# Patient Record
Sex: MALE | Race: WHITE | NOT HISPANIC OR LATINO
[De-identification: names, ages, dates, MRNs, and addresses within clinical notes are randomized per-mention and may not be internally consistent; named-entity substitution may affect disease eponyms.]

---

## 2021-12-21 PROBLEM — Z00.00 ENCOUNTER FOR PREVENTIVE HEALTH EXAMINATION: Status: ACTIVE | Noted: 2021-12-21

## 2022-01-06 ENCOUNTER — NON-APPOINTMENT (OUTPATIENT)
Age: 72
End: 2022-01-06

## 2022-03-02 ENCOUNTER — APPOINTMENT (OUTPATIENT)
Dept: NEUROLOGY | Facility: CLINIC | Age: 72
End: 2022-03-02
Payer: MEDICARE

## 2022-03-02 VITALS
SYSTOLIC BLOOD PRESSURE: 162 MMHG | HEIGHT: 72 IN | WEIGHT: 206 LBS | DIASTOLIC BLOOD PRESSURE: 92 MMHG | BODY MASS INDEX: 27.9 KG/M2 | HEART RATE: 65 BPM

## 2022-03-02 DIAGNOSIS — H53.2 DIPLOPIA: ICD-10-CM

## 2022-03-02 DIAGNOSIS — G25.81 RESTLESS LEGS SYNDROME: ICD-10-CM

## 2022-03-02 PROCEDURE — 99205 OFFICE O/P NEW HI 60 MIN: CPT

## 2022-03-02 NOTE — DISCUSSION/SUMMARY
[FreeTextEntry1] : PD with postural and gait changes c/b RLS. Suspect cognitive issues related to polypharmacy \par CI\par LBP\par \par Patient was counseled on the following recommendations:\par \par - - shift neupro 8mg to qhs and taper off requip over 4 weeks\par - reduce hs sinemet ER to  and leave rest of the regimen the same\par - plan to reduce GBP and donepezil in the future\par - refer to dr. Santillan for LBP management\par - refer to neuropthalmology for eval of diplopia\par - keep log of when right leg tightness occur ?wearing off symptom\par \par f/u 3months

## 2022-03-02 NOTE — PHYSICAL EXAM
[FreeTextEntry1] : Last dose 1hr\par \par as mild generalized LID. EOMI with severe CI. Tremors absent. Esperanza 1+. michel is normal. LE strength5/5 \par Walks with mild-mod camptocormia. Shuffles with no FOG. Pull test is negative

## 2022-04-07 DIAGNOSIS — G20 PARKINSON'S DISEASE: ICD-10-CM

## 2022-04-07 DIAGNOSIS — R41.89 OTHER SYMPTOMS AND SIGNS INVOLVING COGNITIVE FUNCTIONS AND AWARENESS: ICD-10-CM

## 2022-04-11 ENCOUNTER — APPOINTMENT (OUTPATIENT)
Dept: PAIN MANAGEMENT | Facility: CLINIC | Age: 72
End: 2022-04-11

## 2022-07-25 NOTE — HISTORY OF PRESENT ILLNESS
[FreeTextEntry1] : Patient is seeing me for management of PD\par \par Was diagnosed in 2015 with initial symptoms of right hand rest tremor and stooped posture with slowing of his gait. There was no falls. he was started on levodopa with robust improvement in his motor symptoms. About 3 years ago he was started on dopamine agonists for RLS. The first was the neupro patch and ropinrole was eventually added for worsening RLS. He describes his RLS as a gnawing sensation in both legs with need to walk. At this time, his RLS has been stable. When it occurs, he can be pacing all night. \par \par Patient has visual hallucinations for the past 3 years-- tends to see people in his ouse and when driving at times. \par There are no sleep attacks or ICDs\par He is not aware of levodopa kinetics\par He also takes aricept for the past 4 years to address memory issues\par reports right calf tightness sporadically and unpredictably\par When walking, he can festinate and does 'train the brain' therapy which has reduced his falls\par + nights sweats for years\par \par Has chronic LBP and takes tylenol + nsaid daily\par \par Nonmotor:\par sleeps well at night. Has RBD occ \par no constipation\par has double vision- has difficulty reading \par + stutter\par \par \par PD meds:\par c/l ER  1 tab 6-1030-3-730p\par azilect 1mg qd\par neupro 8mg qd\par ropinerole 0.5mg 1mg 4 times per day\par GBP 400mg TID\par aricept 10mg bid To get better and follow your care plan as instructed.

## 2022-07-26 ENCOUNTER — APPOINTMENT (OUTPATIENT)
Dept: NEUROLOGY | Facility: CLINIC | Age: 72
End: 2022-07-26

## 2022-07-26 VITALS
TEMPERATURE: 70 F | SYSTOLIC BLOOD PRESSURE: 68 MMHG | WEIGHT: 180 LBS | DIASTOLIC BLOOD PRESSURE: 44 MMHG | BODY MASS INDEX: 24.38 KG/M2 | HEIGHT: 72 IN

## 2022-07-26 DIAGNOSIS — R44.3 HALLUCINATIONS, UNSPECIFIED: ICD-10-CM

## 2022-07-26 PROCEDURE — 99215 OFFICE O/P EST HI 40 MIN: CPT

## 2022-07-26 RX ORDER — DONEPEZIL HYDROCHLORIDE 10 MG/1
10 TABLET ORAL
Qty: 90 | Refills: 1 | Status: ACTIVE | COMMUNITY
Start: 1900-01-01 | End: 1900-01-01

## 2022-07-26 RX ORDER — ROPINIROLE 0.5 MG/1
0.5 TABLET, FILM COATED ORAL
Qty: 360 | Refills: 1 | Status: ACTIVE | COMMUNITY
Start: 1900-01-01 | End: 1900-01-01

## 2022-07-26 RX ORDER — CARBIDOPA AND LEVODOPA 50; 200 MG/1; MG/1
50-200 TABLET, EXTENDED RELEASE ORAL
Qty: 450 | Refills: 3 | Status: ACTIVE | COMMUNITY
Start: 2022-07-26 | End: 1900-01-01

## 2022-07-26 RX ORDER — QUETIAPINE FUMARATE 25 MG/1
25 TABLET ORAL
Qty: 45 | Refills: 1 | Status: ACTIVE | COMMUNITY
Start: 2022-07-26 | End: 1900-01-01

## 2022-07-26 RX ORDER — GABAPENTIN 400 MG/1
400 CAPSULE ORAL 3 TIMES DAILY
Qty: 270 | Refills: 1 | Status: ACTIVE | COMMUNITY
Start: 1900-01-01 | End: 1900-01-01

## 2022-07-27 NOTE — DISCUSSION/SUMMARY
[FreeTextEntry1] : Advanced PD with neurobehavioral dysregulation and intermittent overnight akathisia with leg pains/tremors/cramps that can be intractable. I suspect that he may be experience an  off dystonic state overnight as these episodes do not sounds like RLS. Since family and HCPs are adament not change DA combination, i recommended the following:\par \par 1. lower aricept to 10mg qd as evening aricept can exacerbate sundowning/akathisia\par 2. Add 1/2 tab CR 5/200 at bedtime with option to give extra 1/2 tab prn overnight \par 3. add seroquel 12.5mg qd prn for bouts of severe paranoia and hallucinations \par 4. leave rest of the regimen unchanged\par \par f/u 3-4 months\par \par

## 2022-07-27 NOTE — PHYSICAL EXAM
[FreeTextEntry1] : \par  EOMI with severe CI. Speech 2+ Tremors absent. Esperanza 2+. Tone is 2+. LE strength5/5 \par Moderate camptocormia. Shuffles with no FOG. Postural reflexes impaired. No LID or dystonia observed

## 2022-12-16 ENCOUNTER — APPOINTMENT (OUTPATIENT)
Dept: NEUROLOGY | Facility: CLINIC | Age: 72
End: 2022-12-16

## 2022-12-16 VITALS
SYSTOLIC BLOOD PRESSURE: 184 MMHG | HEIGHT: 72 IN | WEIGHT: 180 LBS | BODY MASS INDEX: 24.38 KG/M2 | HEART RATE: 66 BPM | DIASTOLIC BLOOD PRESSURE: 99 MMHG

## 2022-12-16 PROCEDURE — 99214 OFFICE O/P EST MOD 30 MIN: CPT

## 2022-12-16 RX ORDER — RASAGILINE 0.5 MG/1
0.5 TABLET ORAL
Qty: 90 | Refills: 3 | Status: ACTIVE | COMMUNITY
Start: 1900-01-01 | End: 1900-01-01

## 2022-12-17 NOTE — HISTORY OF PRESENT ILLNESS
[FreeTextEntry1] : Over the summer he needed to pace constantly overnight r/o RLS like feeling in his legs. In the fall, this happens less. \par Hallucinations persist daily and sees children often. \par Has temperature changes throughout the day w/o any pattern \par Also c/o burning sensation in his legs, L>R. This symptom lasts about 1hr and is not predictable. \par Has had a few syncopal episodes: 2 occurred in the shower. The episodes are transient and is not confused. Admits to having lightheadedness prior to passing out. This has reportedly been going on for many years. \par Patient has aortic valve issues and followed by cardiology. \par Walks daily for exercise. Can festinate at times or shuffles. Does not have FoG\par Makes his own meals, dresses independently \par \par 3 daughters are his HCP\par \par Nonmotor:\par sleeps well at night. Has RBD nightly and has bed guards. \par no constipation\par + stutter\par \par PD meds:\par c/l ER  1 tab 6-0920-3-730p\par azilect 1mg qd\par neupro 8mg qd\par ropinerole 0.5mg 4 times per day\par GBP 400mg TID\par aricept 10mg qd\par melatonin 5mg qhs

## 2022-12-17 NOTE — DISCUSSION/SUMMARY
[FreeTextEntry1] : Advanced PD with neurobehavioral dysregulation and intermittent overnight akathisia with leg pains/tremors/cramps that can be intractable. I suspect that he may be experience an off dystonic state overnight as these episodes do not sounds like RLS. Suspect possible behavioral component as well given presence of hallucinations and punding. Since family and HCPs are adament not change DA combination, i recommended the following:\par \par - reduce azilect to 0.5mg qd\par - leave rest of the regimen the same\par - inc melatonin to 10mg qhs\par - consider nuplazid in the future. \par f/u 4months

## 2023-01-30 ENCOUNTER — RX RENEWAL (OUTPATIENT)
Age: 73
End: 2023-01-30

## 2023-01-30 RX ORDER — ROTIGOTINE 8 MG/24H
8 PATCH, EXTENDED RELEASE TRANSDERMAL DAILY
Qty: 90 | Refills: 3 | Status: ACTIVE | COMMUNITY
Start: 2023-01-30 | End: 1900-01-01

## 2023-04-18 ENCOUNTER — APPOINTMENT (OUTPATIENT)
Dept: NEUROLOGY | Facility: CLINIC | Age: 73
End: 2023-04-18